# Patient Record
Sex: MALE | Race: WHITE | Employment: PART TIME | URBAN - METROPOLITAN AREA
[De-identification: names, ages, dates, MRNs, and addresses within clinical notes are randomized per-mention and may not be internally consistent; named-entity substitution may affect disease eponyms.]

---

## 2018-10-24 ENCOUNTER — APPOINTMENT (OUTPATIENT)
Dept: PHYSICAL THERAPY | Age: 23
End: 2018-10-24
Payer: COMMERCIAL

## 2018-10-25 ENCOUNTER — HOSPITAL ENCOUNTER (OUTPATIENT)
Dept: PHYSICAL THERAPY | Age: 23
Discharge: HOME | End: 2018-10-25
Payer: COMMERCIAL

## 2018-10-25 PROCEDURE — 97110 THERAPEUTIC EXERCISES: CPT

## 2018-10-25 PROCEDURE — 97161 PT EVAL LOW COMPLEX 20 MIN: CPT

## 2018-10-25 NOTE — PROGRESS NOTES
Josiah Marshall 33  [facility] AT [address] [place], [place] [address]carlito Marhsall \[facility] [place] [facility]\"" 47, [address] [place] [address], [address]  Phone: [phone]  Fax: [phone] PLAN OF CARE / STATEMENT OF MEDICAL NECESSITY FOR PHYSICAL THERAPY SERVICES Patient Name: Bianka Lozano : 1995 Medical  
Diagnosis: Low back pain [M54.5] Treatment Diagnosis: LBP Onset Date: 2018 Referral Source: Antoni Castellanos MD Start of Care [facility]): 10/25/2018 Prior Hospitalization: See medical history Provider #: [id number] Prior Level of Function: Unlimited ADLs and lifting without pain. Comorbidities: ADHD/Austism, HTN, asthma, depression, previous h/o LBP Medications: Verified on Patient Summary List  
The Plan of Care and following information is based on the information from the initial evaluation.  
================================================================================== Assessment / key information:  Pt is a 26 y.o. male who presents to [facility] with Dx of LBP. Patient reports initial onset of sx on 2018, KENDALL: lifting heavy weights at the gym while performing back squats and had immediate lower back. Patient reports sx are intermittent in nature. Lifting weight, lifting heavy objects increase sx, swimming, ice, icy hot, use of hot tub, occasion use of ibuprofen decrease symptoms. Average reported pain level at 3/10, 8/10 at worst & 0/10 at best.  Radicular Symptoms: none reported. Objective evaluation findings are as follows: 1) poor ability to maintain upright in unsupported sitting with increased thoracic kyphosis. 2) dec core strength noted in hooklying with difficulty activating TA, BLE strength is WNL with dec MMT into hip abduction and ER rated as 4-/5.  3) negative dural tension tests, PA mobility of l/s is WNL.  4) L/S AROM is full and pain free 5) poor squat form noted with increased lumbar flexion into extension throughout movement, poor dead lift form noted with increased lumbar flexion throughout movement. Pt signs and sx are consistent with lumbar strain/sprain with recurrent aggravation. Patient can benefit from PT interventions to improve strength, range of motion, balance, proprioception, coordination, lifting mechanics, and core stability, decrease pain, to facilitate ADLs & overall functional status.  
================================================================================== Eval Complexity: History MEDIUM  Complexity : 1-2 comorbidities / personal factors will impact the outcome/ POC ;  Examination  MEDIUM Complexity : 3 Standardized tests and measures addressing body structure, function, activity limitation and / or participation in recreation ; Presentation LOW Complexity : Stable, uncomplicated ;  Decision Making MEDIUM Complexity : FOTO score of 26-74; Overall Complexity LOW Problem List: pain affecting function, decrease ROM, decrease strength, edema affecting function, impaired gait/ balance, decrease ADL/ functional abilitiies, decrease activity tolerance, decrease flexibility/ joint mobility and decrease transfer abilities Treatment Plan may include any combination of the following: Therapeutic exercise, Therapeutic activities, Neuromuscular re-education, Physical agent/modality, Gait/balance training, Manual therapy, Aquatic therapy, Patient education, Self Care training, Functional mobility training, Home safety training and Stair training Patient / Family readiness to learn indicated by: asking questions, trying to perform skills and interest 
Persons(s) to be included in education: patient (P) Barriers to Learning/Limitations: None Measures taken:   
Patient Goal (s): \"pain relief\" Patient self reported health status: excellent Rehabilitation Potential: good ? Short Term Goals: To be accomplished in  2  weeks: 
1) Establish HEP to prevent further disability. 2) Patient will report decreased c/o pain to < or = 2/10 to facilitate ADLs with manageable sx in l/s. 3) Patient will demonstrate proper lifting mechanics with light objects to allow for return to safe weight lifting for gym exercise. ? Long Term Goals: To be accomplished in  4-6  weeks: 
1) Patient will be I and compliant with a progress, high level HEP in order to maintain gains made in physical therapy. 2) Patient will be able to maintain modified or regular plank for 30s with good form to allow for good core strength for high level therex. 3) Pt to return to unlimited recreational weight lifting to allow return to PLOF. 4) Pt will increase FOTO score to >/= 63 in order to maintain gains made in physical therapy. Frequency / Duration:   Patient to be seen  2  times per week for 4-6  weeks: 
Patient / Caregiver education and instruction: self care, activity modification, brace/ splint application and exercises G-Codes (GP): CECY Therapist Signature: Mireya Lopez PT, DPT Date: 10/25/2018 Certification Period: NA Time: 4:29 PM  
=========================================================================================== I certify that the above Physical Therapy Services are being furnished while the patient is under my care. I agree with the treatment plan and certify that this therapy is necessary. Physician Signature:       Date:      Time:  Please sign and return to [facility] [place] or you may fax the signed copy to [phone]. Thank you.

## 2018-10-25 NOTE — PROGRESS NOTES
PHYSICAL THERAPY - DAILY TREATMENT NOTE Patient Name: Kourtney Huggins        Date: 10/25/2018 : 1995   yes Patient  Verified Visit #:     Insurance: Payor: MENDY / Plan: Georgiana Clarke PPO / Product Type: PPO / In time: 4:20P Out time: 5:00P Total Treatment Time: 40 Medicare/ [facility] Time Tracking (below) Total Timed Codes (min):  NA 1:1 Treatment Time:  NA  
TREATMENT AREA =  Low back pain [M54.5] SUBJECTIVE Pain Level (on 0 to 10 scale):  3  / 10 Medication Changes/New allergies or changes in medical history, any new surgeries or procedures?    no  If yes, update Summary List  
Subjective Functional Status/Changes:  []  No changes reported See POC OBJECTIVE Modalities Rationale:     decrease pain to improve patient's ability to perform unlimited work duties. min [] Estim, type/location:   
                                 []  att     []  unatt     []  w/US     []  w/ice    []  w/heat 
 min []  Mechanical Traction: type/lbs   
               []  pro   []  sup   []  int   []  cont    []  before manual    []  after manual  
 min []  Ultrasound, settings/location:    
 min []  Iontophoresis w/ dexamethasone, location:   
                                           []  take home patch       []  in clinic  
10 min []  Ice     [x]  Heat    location/position: Lumbar spine in supine   
 min []  Vasopneumatic Device, press/temp:   
 min []  Other:   
[] Skin assessment post-treatment (if applicable):   
[]  intact    []  redness- no adverse reaction    
[]redness  adverse reaction:     
10 min Therapeutic Exercise:  [x]  See flow sheet Rationale:      increase ROM, increase strength, improve coordination, improve balance and increase proprioception to improve the patients ability to perform unlimited ADLs  
 min Patient Education:  yes  Reviewed HEP []  Progressed/Changed HEP based on:     
Other Objective/Functional Measures: 
 
See POC  
  
 Post Treatment Pain Level (on 0 to 10) scale:   0  / 10 ASSESSMENT Assessment/Changes in Function:  
 
See POC []  See Progress Note/Recertification Patient will continue to benefit from skilled PT services to modify and progress therapeutic interventions, address functional mobility deficits, address ROM deficits, address strength deficits, analyze and address soft tissue restrictions, analyze and cue movement patterns, analyze and modify body mechanics/ergonomics, assess and modify postural abnormalities, address imbalance/dizziness and instruct in home and community integration to attain remaining goals. Progress toward goals / Updated goals: 
See POC PLAN [x]  Upgrade activities as tolerated yes Continue plan of care  
[]  Discharge due to :   
[]  Other:   
 
Therapist: Jae Brown PT, DPT Date: 10/25/2018 Time: 5:21 PM  
 
Future Appointments Date Time Provider Ana Laura Johnson 10/30/2018  6:00 PM Cheri Arriola, [place] [facility]  
10/31/2018  6:00 PM Jennifer Barrientos PT [facility]  
11/6/2018  4:30 PM Ross Jaquez [facility]  
11/7/2018  5:30 PM Jennifer Barrientos PT [facility]  
11/13/2018  5:00 PM Cheri Arriola PT [facility]  
11/14/2018  6:00 PM Jennifer Barrientos, PT [facility]

## 2018-10-30 ENCOUNTER — HOSPITAL ENCOUNTER (OUTPATIENT)
Dept: PHYSICAL THERAPY | Age: 23
Discharge: HOME | End: 2018-10-30
Payer: COMMERCIAL

## 2018-10-30 PROCEDURE — 97530 THERAPEUTIC ACTIVITIES: CPT

## 2018-10-30 PROCEDURE — 97110 THERAPEUTIC EXERCISES: CPT

## 2018-10-30 NOTE — PROGRESS NOTES
PHYSICAL THERAPY - DAILY TREATMENT NOTE Patient Name: Paz Blas        Date: 10/30/2018 : 1995   YES Patient  Verified Visit #:   2   of   12  Insurance: Payor: MENDY / Plan: Kristopher Shafer PPO / Product Type: PPO / In time: 6:00 P Out time: 7:00 P Total Treatment Time: 54 Medicare Time Tracking (below) Total Timed Codes (min):  NA 1:1 Treatment Time:  NA  
TREATMENT AREA =  Low back pain [M54.5] SUBJECTIVE Pain Level (on 0 to 10 scale):  -3  / 10 Medication Changes/New allergies or changes in medical history, any new surgeries or procedures? NO    If yes, update Summary List  
Subjective Functional Status/Changes:  []  No changes reported Benja Real reports that he is holding off on weights at the gym until his back feels better. OBJECTIVE Modalities Rationale:     decrease pain to improve patient's ability to return to pain-free lifting 
 min [] Estim, type/location:   
                                 []  att     []  unatt     []  w/US     []  w/ice    []  w/heat 
 min []  Mechanical Traction: type/lbs   
               []  pro   []  sup   []  int   []  cont    []  before manual    []  after manual  
 min []  Ultrasound, settings/location:    
 min []  Iontophoresis w/ dexamethasone, location:   
                                           []  take home patch       []  in clinic  
10 min []  Ice     [x]  Heat    location/position: Supine to l/s   
 min []  Vasopneumatic Device, press/temp:   
 min []  Other:   
[] Skin assessment post-treatment (if applicable):   
[]  intact    []  redness- no adverse reaction    
[]redness  adverse reaction:     
35/ 
30 min Therapeutic Exercise:  [x]  See flow sheet Rationale:      increase ROM and increase strength to improve the patients ability to return to pain-free lifting  
 min Manual Therapy:   
Rationale:    
10 min Therapeutic Activity: Use of dowel & visual cues for proper hip hinge in standing as well as fxl squat & sit to stand transfers, max v/c for neutral spine Rationale:    improve coordination and increase proprioception to improve the patients ability to return to pain-free sport 
 
 min Neuromuscular Re-ed:   
Rationale:    
 
 min Gait Training:   
Rationale:    / 
25 
 min Patient Education:  Abimbola Burkett []  Progressed/Changed HEP based on: Other Objective/Functional Measures: Added SB stab in H/L, PPT Post Treatment Pain Level (on 0 to 10) scale:   1-2  / 10 ASSESSMENT Assessment/Changes in Function:  
 
Good tolerance to initial program, no increase in pain today 
  
[]  See Progress Note/Recertification Patient will continue to benefit from skilled PT services to modify and progress therapeutic interventions, address functional mobility deficits, address ROM deficits, address strength deficits, assess and modify postural abnormalities and instruct in home and community integration to attain remaining goals. Progress toward goals / Updated goals: 
Progressing towards goals established at [facility] PLAN [x]  Upgrade activities as tolerated YES Continue plan of care  
[]  Discharge due to :   
[]  Other:   
 
Therapist: Marianna Leon PT Date: 10/30/2018 Time: 7:08 PM  
 
Future Appointments Date Time Provider Ana Laura Johnson 10/31/2018  6:00 PM Antonino Becerra [facility]  
11/6/2018  4:30 PM Ilia Neal [facility]  
11/7/2018  5:30 PM Zuleyma Farris [facility]  
11/13/2018  5:00 PM Oracio Pena PT [facility]  
11/14/2018  6:00 PM Antonino Becerra PT [facility] [place] [facility]

## 2018-10-31 ENCOUNTER — HOSPITAL ENCOUNTER (OUTPATIENT)
Dept: PHYSICAL THERAPY | Age: 23
Discharge: HOME | End: 2018-10-31
Payer: COMMERCIAL

## 2018-10-31 PROCEDURE — 97110 THERAPEUTIC EXERCISES: CPT

## 2018-10-31 NOTE — PROGRESS NOTES
PHYSICAL THERAPY - DAILY TREATMENT NOTE Patient Name: Joey Russ        Date: 10/31/2018 : 1995   YES Patient  Verified Visit #:   3   of   12  Insurance: Payor: MENDY / Plan: Miguel Root PPO / Product Type: PPO / In time: 555 Out time: 707 Total Treatment Time: 45 Medicare/[employer] Time Tracking (below) Total Timed Codes (min):  \ 1:1 Treatment Time:  \  
TREATMENT AREA =  Low back pain [M54.5] SUBJECTIVE Pain Level (on 0 to 10 scale):  -3  / 10 Medication Changes/New allergies or changes in medical history, any new surgeries or procedures? NO    If yes, update Summary List  
Subjective Functional Status/Changes:  []  No changes reported No change in pain after LV OBJECTIVE Modalities Rationale:     decrease inflammation, decrease pain and increase tissue extensibility to improve patient's ability to perform functional ADLs 
 min [] Estim, type/location:   
                                 []  att     []  unatt     []  w/US     []  w/ice    []  w/heat 
 min []  Mechanical Traction: type/lbs   
               []  pro   []  sup   []  int   []  cont    []  before manual    []  after manual  
 min []  Ultrasound, settings/location:    
 min []  Iontophoresis w/ dexamethasone, location:   
                                           []  take home patch       []  in clinic  
 min []  Ice     []  Heat    location/position:   
 min []  Vasopneumatic Device, press/temp:   
 min []  Other:   
[] Skin assessment post-treatment (if applicable):   
[]  intact    []  redness- no adverse reaction    
[]redness  adverse reaction:     
45 min Therapeutic Exercise:  [x]  See flow sheet Rationale:      increase ROM and increase strength to improve the patients ability to regain functional mobility of L/S for return to weight lifting 
 min Manual Therapy: Technique:     
[] S/DTM []IASTM []PROM [] Passive Stretching []manual TPR  [] TDN (see objective; actual needle insertion time not billed) []Jt manipulation:Gr I [] II []  III [] IV[] V[] Treatment/Area:    
Rationale:      decrease pain, increase ROM, increase tissue extensibility and decrease trigger points to improve patient's ability to  min Therapeutic Activity:   
Rationale:    increase strength, improve coordination and increase proprioception to improve the patients ability to  
 min Neuromuscular Re-ed:   
Rationale:    improve coordination, improve balance and increase proprioception to improve the patients ability to  
 min Gait Training:   
Rationale:     min Patient Education:  YES  Reviewed HEP []  Progressed/Changed HEP based on: Other Objective/Functional Measures: 
 
TE per FS Frequent VC for inc hip hinge during both dowel squat and deadlift. Improved form in Vergie Fee rack, able to maintain neutral L/S and adequate hip hinge for both Post Treatment Pain Level (on 0 to 10) scale:   2  / 10 ASSESSMENT Assessment/Changes in Function:  
 
Progressed treatment as appropriate with good patient tolerance. Dec postural awareness difficulty finding neutral L/S in unsupported sitting without frequent cueing 
  
[]  See Progress Note/Recertification Patient will continue to benefit from skilled PT services to modify and progress therapeutic interventions, address functional mobility deficits, address ROM deficits, address strength deficits, analyze and address soft tissue restrictions, analyze and cue movement patterns, analyze and modify body mechanics/ergonomics and assess and modify postural abnormalities to attain remaining goals. Progress toward goals / Updated goals: 
Progressing towards STG1 PLAN [x]  Upgrade activities as tolerated YES Continue plan of care  
[]  Discharge due to :   
[]  Other:   
 
Therapist: Gustavo Joyner, PT, DPT, MTC, CMTPT Date: 10/31/2018 Time: 6:10 PM  
 
Future Appointments Date Time Provider Ana Laura Elizabeth 11/6/2018  4:30 PM [place] Samnao [facility]  
11/7/2018  5:30 PM Yinka Blackburn [facility]  
11/13/2018  5:00 PM Susana Michelle, PT [facility]  
11/14/2018  6:00 PM Elian Wong, PT [facility]

## 2018-11-06 ENCOUNTER — HOSPITAL ENCOUNTER (OUTPATIENT)
Dept: PHYSICAL THERAPY | Age: 23
Discharge: HOME | End: 2018-11-06
Payer: COMMERCIAL

## 2018-11-06 PROCEDURE — 97110 THERAPEUTIC EXERCISES: CPT

## 2018-11-06 NOTE — PROGRESS NOTES
PHYSICAL THERAPY - DAILY TREATMENT NOTE Patient Name: Joey Russ        Date: 2018 : 1995   yes Patient  Verified Visit #:     Insurance: Payor: MENDY / Plan: Miguel Root PPO / Product Type: PPO / In time: 4:20P Out time: 5:30P Total Treatment Time: 70 Medicare/ [facility] Time Tracking (below) Total Timed Codes (min):  NA 1:1 Treatment Time:  NA  
TREATMENT AREA =  Low back pain [M54.5] SUBJECTIVE Pain Level (on 0 to 10 scale):  2  / 10 Medication Changes/New allergies or changes in medical history, any new surgeries or procedures?    no  If yes, update Summary List  
Subjective Functional Status/Changes:  []  No changes reported \"My back is feeling better, and I feel like I am more conscious about movements. \" OBJECTIVE Modalities Rationale:     decrease pain to improve patient's ability to perform unlimited ADLs 
 min [] Estim, type/location:   
                                 []  att     []  unatt     []  w/US     []  w/ice    []  w/heat 
 min []  Mechanical Traction: type/lbs   
               []  pro   []  sup   []  int   []  cont    []  before manual    []  after manual  
 min []  Ultrasound, settings/location:    
 min []  Iontophoresis w/ dexamethasone, location:   
                                           []  take home patch       []  in clinic  
10 min []  Ice     [x]  Heat    location/position: L/s in supine   
 min []  Vasopneumatic Device, press/temp:   
 min []  Other:   
[] Skin assessment post-treatment (if applicable):   
[]  intact    []  redness- no adverse reaction    
[]redness  adverse reaction:     
60 min Therapeutic Exercise:  [x]  See flow sheet Rationale:      increase ROM, increase strength, improve coordination, improve balance and increase proprioception to improve the patients ability to perform unlimited ADLs  
 min Patient Education:  yes  Reviewed HEP []  Progressed/Changed HEP based on: Other Objective/Functional Measures: 
 
Difficulty performing hip hinge with dowel today Poor ability to recruit rectus abdominus to perform PPT with knees bent and in supine. Quick fatigue with squatting and maintaining a neutral spine Post Treatment Pain Level (on 0 to 10) scale:   0  / 10 ASSESSMENT Assessment/Changes in Function:  
 
Patient continues to have poor core strength and poor ability to recruit core mm in hooklying with difficulty with motor control performing deadlift motion > squat. []  See Progress Note/Recertification Patient will continue to benefit from skilled PT services to modify and progress therapeutic interventions, address functional mobility deficits, address ROM deficits, address strength deficits, analyze and address soft tissue restrictions, analyze and cue movement patterns, analyze and modify body mechanics/ergonomics, assess and modify postural abnormalities, address imbalance/dizziness and instruct in home and community integration to attain remaining goals. Progress toward goals / Updated goals: 
Slow progress towards goals. PLAN [x]  Upgrade activities as tolerated yes Continue plan of care  
[]  Discharge due to :   
[]  Other:   
 
Therapist: Lanie Ruth PT, DPT Date: 11/6/2018 Time: 6:51 PM  
 
Future Appointments Date Time Provider Ana Laura Johnson 11/7/2018  5:30 PM Gurmeet Murillo [facility]  
11/13/2018  5:00 PM Siomara Wells, PT [facility]  
11/14/2018  6:00 PM Sesar Leal PT [facility]

## 2018-11-07 ENCOUNTER — APPOINTMENT (OUTPATIENT)
Dept: PHYSICAL THERAPY | Age: 23
End: 2018-11-07
Payer: COMMERCIAL

## 2018-11-13 ENCOUNTER — HOSPITAL ENCOUNTER (OUTPATIENT)
Dept: PHYSICAL THERAPY | Age: 23
Discharge: HOME | End: 2018-11-13
Payer: COMMERCIAL

## 2018-11-13 PROCEDURE — 97110 THERAPEUTIC EXERCISES: CPT

## 2018-11-14 ENCOUNTER — HOSPITAL ENCOUNTER (OUTPATIENT)
Dept: PHYSICAL THERAPY | Age: 23
Discharge: HOME | End: 2018-11-14
Payer: COMMERCIAL

## 2018-11-14 PROCEDURE — 97110 THERAPEUTIC EXERCISES: CPT

## 2018-11-14 NOTE — PROGRESS NOTES
PHYSICAL THERAPY - DAILY TREATMENT NOTE Patient Name: Martine Geronimo        Date: 2018 : 1995   YES Patient  Verified Visit #:     Insurance: Payor: MENDY / Plan: Vandana Rivera PPO / Product Type: PPO / In time: 4:55 P Out time: 5:55 P Total Treatment Time: 54 Medicare Time Tracking (below) Total Timed Codes (min):  NA 1:1 Treatment Time:  NA  
TREATMENT AREA =  Low back pain [M54.5] SUBJECTIVE Pain Level (on 0 to 10 scale):  2  / 10 Medication Changes/New allergies or changes in medical history, any new surgeries or procedures? NO    If yes, update Summary List  
Subjective Functional Status/Changes:  []  No changes reported Patient reports that he is feeling much better & working on his posture. OBJECTIVE Modalities Rationale:     decrease pain to improve patient's ability to return to pain-free lifting 
 min [] Estim, type/location:   
                                 []  att     []  unatt     []  w/US     []  w/ice    []  w/heat 
 min []  Mechanical Traction: type/lbs   
               []  pro   []  sup   []  int   []  cont    []  before manual    []  after manual  
 min []  Ultrasound, settings/location:    
 min []  Iontophoresis w/ dexamethasone, location:   
                                           []  take home patch       []  in clinic  
10 min []  Ice     [x]  Heat    location/position: Supine to l/s   
 min []  Vasopneumatic Device, press/temp:   
 min []  Other:   
[] Skin assessment post-treatment (if applicable):   
[]  intact    []  redness- no adverse reaction    
[]redness  adverse reaction:     
45 min Therapeutic Exercise:  [x]  See flow sheet Rationale:      increase ROM and increase strength to improve the patients ability to return to pain-free lifting program  
 min Manual Therapy:   
Rationale:      
 min Therapeutic Activity:   
Rationale:     
 
 min Neuromuscular Re-ed:   
Rationale:   
 
 min Gait Training: Rationale:     min Patient Education:  YES  Reviewed HEP []  Progressed/Changed HEP based on: Other Objective/Functional Measures: Modified DL at [facility] 207 to 15 & 25# DB Post Treatment Pain Level (on 0 to 10) scale:   0  / 10 ASSESSMENT Assessment/Changes in Function:  
  
Good BM with neutral posture with dowel with hip hinge, squats with dowel & walking lunge, v/c & use of mirror to avoid l/s collapse with flexion & to control depth of squat as  Well as improved form with DL with use of dumbells today  
  
[]  See Progress Note/Recertification Patient will continue to benefit from skilled PT services to modify and progress therapeutic interventions, address functional mobility deficits, address ROM deficits, address strength deficits, analyze and address soft tissue restrictions, analyze and modify body mechanics/ergonomics, assess and modify postural abnormalities and instruct in home and community integration to attain remaining goals. Progress toward goals / Updated goals: 
Progressing towards LTGs PLAN [x]  Upgrade activities as tolerated YES Continue plan of care  
[]  Discharge due to :   
[]  Other:   
 
Therapist: Alison Lopez PT Date: 11/13/2018 Time: 8:46 PM  
 
Future Appointments Date Time Provider Ana Laura Johnson 11/14/2018  6:00 PM Hanna Jimenez, [place] [facility]

## 2018-11-15 NOTE — PROGRESS NOTES
PHYSICAL THERAPY - DAILY TREATMENT NOTE Patient Name: Lina Reveles        Date: 2018 : 1995   YES Patient  Verified Visit #:     Insurance: Payor: Sandie Munoz / Plan: Kevin Quinones PPO / Product Type: PPO / In time: 600 Out time: 700 Total Treatment Time: 54 Medicare/[facility] Time Tracking (below) Total Timed Codes (min):   1:1 Treatment Time:    
TREATMENT AREA =  Low back pain [M54.5] SUBJECTIVE Pain Level (on 0 to 10 scale):  2  / 10 Medication Changes/New allergies or changes in medical history, any new surgeries or procedures? NO    If yes, update Summary List  
Subjective Functional Status/Changes:  []  No changes reported Back is a yakelin;e better but I havent been doing squats or DL at the gym OBJECTIVE Modalities Rationale:     decrease inflammation, decrease pain and increase tissue extensibility to improve patient's ability to perform functional ADLs 
 min [] Estim, type/location:   
                                 []  att     []  unatt     []  w/US     []  w/ice    []  w/heat 
 min []  Mechanical Traction: type/lbs   
               []  pro   []  sup   []  int   []  cont    []  before manual    []  after manual  
 min []  Ultrasound, settings/location:    
 min []  Iontophoresis w/ dexamethasone, location:   
                                           []  take home patch       []  in clinic  
10 min []  Ice     [x]  Heat    location/position: L/S in supine  
 min []  Vasopneumatic Device, press/temp:   
 min []  Other:   
[] Skin assessment post-treatment (if applicable):   
[]  intact    []  redness- no adverse reaction    
[]redness  adverse reaction:     
45 min Therapeutic Exercise:  [x]  See flow sheet Rationale:      increase ROM and increase strength to improve the patients ability to regain functional mobility of L/S for return to weight lifting 
 min Manual Therapy: Technique:     
[] S/DTM []IASTM []PROM [] Passive Stretching []manual TPR  [] TDN (see objective; actual needle insertion time not billed) []Jt manipulation:Gr I [] II []  III [] IV[] V[] Treatment/Area:    
Rationale:      decrease pain, increase ROM, increase tissue extensibility and decrease trigger points to improve patient's ability to  min Therapeutic Activity:   
Rationale:    increase strength, improve coordination and increase proprioception to improve the patients ability to  
 min Neuromuscular Re-ed:   
Rationale:    improve coordination, improve balance and increase proprioception to improve the patients ability to  
 min Gait Training:   
Rationale:     min Patient Education:  YES  Reviewed HEP []  Progressed/Changed HEP based on: Other Objective/Functional Measures: 
 
TE per FS Added TA core stability TE with stab cuff Post Treatment Pain Level (on 0 to 10) scale:   0  / 10 ASSESSMENT Assessment/Changes in Function:  
 
Difficulty isolating TA without moving into lumbar flexion, requiring constant cues for form during mat TE. Improved hip hinge with Liang squat/DL but poor postural awareness and collapse of l/s into flexion at end range 
  
[]  See Progress Note/Recertification Patient will continue to benefit from skilled PT services to modify and progress therapeutic interventions, address functional mobility deficits, address ROM deficits, address strength deficits, analyze and address soft tissue restrictions, analyze and cue movement patterns, analyze and modify body mechanics/ergonomics and assess and modify postural abnormalities to attain remaining goals. Progress toward goals / Updated goals: 
Progressing towards STG3 PLAN [x]  Upgrade activities as tolerated YES Continue plan of care  
[]  Discharge due to :   
[]  Other:   
 
Therapist: Alka Odell, PT, DPT, MTC, CMTPT Date: 11/14/2018 Time: 6:10 PM  
 
No future appointments.

## 2018-11-28 ENCOUNTER — HOSPITAL ENCOUNTER (OUTPATIENT)
Dept: PHYSICAL THERAPY | Age: 23
Discharge: HOME | End: 2018-11-28
Payer: COMMERCIAL

## 2018-11-28 PROCEDURE — 97110 THERAPEUTIC EXERCISES: CPT

## 2018-11-28 NOTE — PROGRESS NOTES
PHYSICAL THERAPY - DAILY TREATMENT NOTE Patient Name: Roque Kevin        Date: 2018 : 1995   YES Patient  Verified Visit #:     Insurance: Payor: MENDY / Plan: Cherise Jaimes PPO / Product Type: PPO / In time: 800 Out time: 900 Total Treatment Time: 54 Medicare/[facility] Time Tracking (below) Total Timed Codes (min):   1:1 Treatment Time:    
TREATMENT AREA =  Low back pain [M54.5] SUBJECTIVE Pain Level (on 0 to 10 scale):  1.5  / 10 Medication Changes/New allergies or changes in medical history, any new surgeries or procedures? NO    If yes, update Summary List  
Subjective Functional Status/Changes:  []  No changes reported Feel the pain when I lean back and to the right and when I first pick something up, even if its light weight OBJECTIVE Modalities Rationale:     decrease inflammation, decrease pain and increase tissue extensibility to improve patient's ability to perform functional ADLs 
 min [] Estim, type/location:   
                                 []  att     []  unatt     []  w/US     []  w/ice    []  w/heat 
 min []  Mechanical Traction: type/lbs   
               []  pro   []  sup   []  int   []  cont    []  before manual    []  after manual  
 min []  Ultrasound, settings/location:    
 min []  Iontophoresis w/ dexamethasone, location:   
                                           []  take home patch       []  in clinic  
TC min []  Ice     []  Heat    location/position:   
 min []  Vasopneumatic Device, press/temp:   
 min []  Other:   
[] Skin assessment post-treatment (if applicable):   
[]  intact    []  redness- no adverse reaction    
[]redness  adverse reaction:     
55 min Therapeutic Exercise:  [x]  See flow sheet Rationale:      increase ROM and increase strength to improve the patients ability to regain functional mobility of L/S for return to weight lifting 
 min Manual Therapy: Technique: [] S/DTM []IASTM []PROM [] Passive Stretching  
[]manual TPR  [] TDN (see objective; actual needle insertion time not billed) []Jt manipulation:Gr I [] II []  III [] IV[] V[] Treatment/Area:    
Rationale:      decrease pain, increase ROM, increase tissue extensibility and decrease trigger points to improve patient's ability to  min Therapeutic Activity:   
Rationale:    increase strength, improve coordination and increase proprioception to improve the patients ability to  
 min Neuromuscular Re-ed:   
Rationale:    improve coordination, improve balance and increase proprioception to improve the patients ability to  
 min Gait Training:   
Rationale:     min Patient Education:  YES  Reviewed HEP []  Progressed/Changed HEP based on: Other Objective/Functional Measures: 
 
TE per FS Utilized visual cue form mirror and video filming to improve squat/deadlift mechanics Post Treatment Pain Level (on 0 to 10) scale:   0  / 10 ASSESSMENT Assessment/Changes in Function:  
 
Cont to require cueing to prevent L/S collapse into flexion at bottom of squat/deadlift. Difficulty maintaining scapular retraction and neutral upper t/s during deadlifts. Used postural taping technique as cue 
  
[]  See Progress Note/Recertification Patient will continue to benefit from skilled PT services to modify and progress therapeutic interventions, address functional mobility deficits, address ROM deficits, address strength deficits, analyze and address soft tissue restrictions, analyze and cue movement patterns, analyze and modify body mechanics/ergonomics and assess and modify postural abnormalities to attain remaining goals. Progress toward goals / Updated goals: 
Progressing towards STG3 PLAN [x]  Upgrade activities as tolerated YES Continue plan of care  
[]  Discharge due to :   
[]  Other:   
 
Therapist: Alona Dorman, PT, DPT, MTC, CMTPT Date: 11/28/2018 Time: 6:10 PM  
 
Future Appointments Date Time Provider Ana Laura Elizabeth 12/3/2018  5:00 PM [facility] [place]  
12/5/2018  9:30 AM Maggie Carter [facility]  
12/10/2018  5:00 PM [facility] [place]  
12/12/2018  8:00 AM [facility]

## 2018-12-03 ENCOUNTER — HOSPITAL ENCOUNTER (OUTPATIENT)
Dept: PHYSICAL THERAPY | Age: 23
Discharge: HOME | End: 2018-12-03
Payer: COMMERCIAL

## 2018-12-03 PROCEDURE — 97110 THERAPEUTIC EXERCISES: CPT

## 2018-12-03 NOTE — PROGRESS NOTES
PHYSICAL THERAPY - DAILY TREATMENT NOTE Patient Name: Paul Larkin        Date: 12/3/2018 : 1995   YES Patient  Verified Visit #:     Insurance: Payor: MENDY / Plan: Nicole Matt PPO / Product Type: PPO / In time: 4:50 PM Out time: 5:50 PM  
Total Treatment Time: 60 Medicare Time Tracking (below) Total Timed Codes (min):  NA 1:1 Treatment Time:  NA  
TREATMENT AREA =  Low back pain [M54.5] SUBJECTIVE Pain Level (on 0 to 10 scale):  2  / 10 Medication Changes/New allergies or changes in medical history, any new surgeries or procedures? NO    If yes, update Summary List  
Subjective Functional Status/Changes:  []  No changes reported Patient reports having to sit in class for a long time today so his back is a little annoyed. OBJECTIVE Modalities Rationale:     decrease pain to improve patient's ability to return to pain-free lifting 
 min [] Estim, type/location:   
                                 []  att     []  unatt     []  w/US     []  w/ice    []  w/heat 
 min []  Mechanical Traction: type/lbs   
               []  pro   []  sup   []  int   []  cont    []  before manual    []  after manual  
 min []  Ultrasound, settings/location:    
 min []  Iontophoresis w/ dexamethasone, location:   
                                           []  take home patch       []  in clinic  
10 min []  Ice     [x]  Heat    location/position: To L/S in supine with wedge post-session  
 min []  Vasopneumatic Device, press/temp:   
 min []  Other:   
[] Skin assessment post-treatment (if applicable):   
[]  intact    []  redness- no adverse reaction    
[]redness  adverse reaction:     
50 min Therapeutic Exercise:  [x]  See flow sheet Rationale:      increase ROM and increase strength to improve the patients ability to return to pain free lifting with good body mechanics 
 min Manual Therapy:   
Rationale:      
 min Therapeutic Activity:   
Rationale: min Neuromuscular Re-ed:   
Rationale:   
 
 min Gait Training:   
Rationale:     min Patient Education:  Juan Antonio Lee []  Progressed/Changed HEP based on: Other Objective/Functional Measures: Alternated between Smith squat and prone Ts on bench (superset) and Liang deadlift with seated TB bilateral ER (superset) to improve scapular activation Post Treatment Pain Level (on 0 to 10) scale:   0  / 10 ASSESSMENT Assessment/Changes in Function:  
 
Noted increase ms fatigue with 2nd set of squats and deadlifts; demonstrated increase L/S flexion and poor scap activation. Noted improved technique following rest and superset. []  See Progress Note/Recertification Patient will continue to benefit from skilled PT services to modify and progress therapeutic interventions, address functional mobility deficits, address ROM deficits, address strength deficits, analyze and address soft tissue restrictions, analyze and modify body mechanics/ergonomics, assess and modify postural abnormalities and instruct in home and community integration to attain remaining goals. Progress toward goals / Updated goals: 
Good progress towards lifting body mechanics goals PLAN [x]  Upgrade activities as tolerated YES Continue plan of care  
[]  Discharge due to :   
[]  Other:   
 
Therapist: ROMINA Swann Date: 12/3/2018 Time: 7:04 PM  
 
Future Appointments Date Time Provider Ana Laura Johnson 12/3/2018  5:00 PM [facility]  
[facility]  
12/10/2018  5:00 PM [facility]  
[facility]2018  [facility]

## 2018-12-05 ENCOUNTER — HOSPITAL ENCOUNTER (OUTPATIENT)
Dept: PHYSICAL THERAPY | Age: 23
Discharge: HOME | End: 2018-12-05
Payer: COMMERCIAL

## 2018-12-05 PROCEDURE — 97110 THERAPEUTIC EXERCISES: CPT

## 2018-12-05 NOTE — PROGRESS NOTES
PHYSICAL THERAPY - DAILY TREATMENT NOTE Patient Name: Ovidio Bidding        Date: 2018 : 1995   yes Patient  Verified Visit #:     Insurance: Payor: MENDY / Plan: Nohemy Castaneda PPO / Product Type: PPO / In time: 9:30A Out time: 10:40A Total Treatment Time: 70 Medicare/ [facility] Time Tracking (below) Total Timed Codes (min):  NA 1:1 Treatment Time:  NA  
TREATMENT AREA =  Low back pain [M54.5] SUBJECTIVE Pain Level (on 0 to 10 scale):  2  / 10 Medication Changes/New allergies or changes in medical history, any new surgeries or procedures?    no  If yes, update Summary List  
Subjective Functional Status/Changes:  []  No changes reported \"I am doing better, still not squatting or lifting in the gym. \": OBJECTIVE Modalities Rationale:     decrease pain to improve patient's ability to perform unlimited ADLs 
 min [] Estim, type/location:   
                                 []  att     []  unatt     []  w/US     []  w/ice    []  w/heat 
 min []  Mechanical Traction: type/lbs   
               []  pro   []  sup   []  int   []  cont    []  before manual    []  after manual  
 min []  Ultrasound, settings/location:    
 min []  Iontophoresis w/ dexamethasone, location:   
                                           []  take home patch       []  in clinic  
10 min []  Ice     [x]  Heat    location/position: L/s in supine  
 min []  Vasopneumatic Device, press/temp:   
 min []  Other:   
[] Skin assessment post-treatment (if applicable):   
[]  intact    []  redness- no adverse reaction    
[]redness  adverse reaction:     
60 min Therapeutic Exercise:  [x]  See flow sheet Rationale:      increase ROM, increase strength, improve coordination, improve balance and increase proprioception to improve the patients ability to perform unlimited ADLs   
 min Patient Education:  yes  Reviewed HEP []  Progressed/Changed HEP based on: Other Objective/Functional Measures: See PN Post Treatment Pain Level (on 0 to 10) scale:   0  / 10 ASSESSMENT Assessment/Changes in Function:  
 
See PN  
  
[]  See Progress Note/Recertification Patient will continue to benefit from skilled PT services to modify and progress therapeutic interventions, address functional mobility deficits, address ROM deficits, address strength deficits, analyze and address soft tissue restrictions, analyze and cue movement patterns, analyze and modify body mechanics/ergonomics, assess and modify postural abnormalities, address imbalance/dizziness and instruct in home and community integration to attain remaining goals. Progress toward goals / Updated goals: 
See PN   
 
PLAN [x]  Upgrade activities as tolerated yes Continue plan of care  
[]  Discharge due to :   
[]  Other:   
 
Therapist: Beverley Hameed PT, DPT Date: 12/5/2018 Time: 10:13 AM  
 
Future Appointments Date Time Provider Ana Laura Johnson 12/10/2018  5:00 PM [facility]  
12/12/2018  8:00 AM Nessa Brooks [facility]

## 2018-12-05 NOTE — PROGRESS NOTES
[address] [facility] PHYSICAL THERAPY  [facility], [address] ,[address], [address]  Phone: [phone]  Fax: [phone] PROGRESS NOTE Patient Name: Donny Hall : 1995 Treatment/Medical Diagnosis: Low back pain [M54.5] Referral Source: Austin Metcalf MD    
Date of Initial Visit: 10/25/18 Attended Visits: 9 Missed Visits: 0  
SUMMARY OF TREATMENT Therapeutic exercise to increase strength, range of motion, balance, proprioception, coordination, postural control, core stability, and lifting mechanics. Modalities as needed for pain control. CURRENT STATUS Mr. Xie Parent has made good progress with physical therapy. He reports feeling 90% improved since starting physical therapy, however he continues to refrain from lifting in the gym. He has pain when lifting objects from the ground and while jogging. Pt continues to have difficulty finding a neutral spine in quadruped and while performing high level activities. Core stability is improving from hooklying to sitting requiring mod cuing for core activation in sitting and unsupported sitting. Please see below for progress towards LTG:     
 
Goal/Measure of Progress Goal Met? 1.  Establish HEP to prevent further disability. Status at last Eval: - Current Status: established yes 2. Patient will report decreased c/o pain to < or = 2/10 to facilitate ADLs with manageable sx in l/s. Status at last Eval: 0-8 Current Status: 0-1.5 yes 3. Patient will demonstrate proper lifting mechanics with light objects to allow for return to safe weight lifting for gym exercise. Status at last Eval: Poor mechanics and postural awareness Current Status: Improved postural awareness, requires min VC and visual cuing. Progressing New Goals to be achieved in __4__  weeks: 1. Patient will be I and compliant with a progress, high level HEP in order to maintain gains made in physical therapy. 2.  Patient will be able to maintain modified or regular plank for 30s with good form to allow for good core strength for high level therex. 3.  Pt to return to unlimited recreational weight lifting to allow return to PLOF. G-Codes (GP): NA 
RECOMMENDATIONS Continue PT rx 2x per week for 4 weeks to work towards LTG. If you have any questions/comments please contact us directly at [phone]. Thank you for allowing us to assist in the care of your patient. Therapist Signature: Brad Vera PT, DPT Date: 12/5/2018 Time: 10:13 AM  
NOTE TO PHYSICIAN:  PLEASE COMPLETE THE ORDERS BELOW AND FAX TO [facility]: [phone]. If you are unable to process this request in 24 hours please contact our office: [phone]. 
 
___ I have read the above report and request that my patient continue as recommended.  
___ I have read the above report and request that my patient continue therapy with the following changes/special instructions:_________________________________________________________  
___ I have read the above report and request that my patient be discharged from therapy.   
 
Physician Signature:       Date:      Time:

## 2018-12-10 ENCOUNTER — HOSPITAL ENCOUNTER (OUTPATIENT)
Dept: PHYSICAL THERAPY | Age: 23
Discharge: HOME | End: 2018-12-10
Payer: COMMERCIAL

## 2018-12-10 PROCEDURE — 97110 THERAPEUTIC EXERCISES: CPT

## 2018-12-10 NOTE — PROGRESS NOTES
PHYSICAL THERAPY - DAILY TREATMENT NOTE Patient Name: Jackie Ryan        Date: 12/10/2018 : 1995   YES Patient  Verified Visit #:     Insurance: Payor: MENDY / Plan: Isaac Galdamez PPO / Product Type: PPO / In time: 5:00 PM Out time: 5:57 PM  
Total Treatment Time: 62 Medicare Time Tracking (below) Total Timed Codes (min):  NA 1:1 Treatment Time:  NA  
TREATMENT AREA =  Low back pain [M54.5] SUBJECTIVE Pain Level (on 0 to 10 scale):  3 / 10 Medication Changes/New allergies or changes in medical history, any new surgeries or procedures? NO    If yes, update Summary List  
Subjective Functional Status/Changes:  []  No changes reported \"I went to squat at my gym. I had 45# plates on each side and I don't think I was ready. \" OBJECTIVE Modalities Rationale:     decrease pain to improve patient's ability to return to pain-free lifting 
 min [] Estim, type/location:   
                                 []  att     []  unatt     []  w/US     []  w/ice    []  w/heat 
 min []  Mechanical Traction: type/lbs   
               []  pro   []  sup   []  int   []  cont    []  before manual    []  after manual  
 min []  Ultrasound, settings/location:    
 min []  Iontophoresis w/ dexamethasone, location:   
                                           []  take home patch       []  in clinic  
10 min []  Ice     [x]  Heat    location/position: To L/S in supine with wedge post-session  
 min []  Vasopneumatic Device, press/temp:   
 min []  Other:   
[] Skin assessment post-treatment (if applicable):   
[]  intact    []  redness- no adverse reaction    
[]redness  adverse reaction:     
47 min Therapeutic Exercise:  [x]  See flow sheet Rationale:      increase ROM and increase strength to improve the patients ability to return to pain free lifting with good body mechanics 
 min Manual Therapy:   
Rationale:      
 min Therapeutic Activity:   
Rationale: min Neuromuscular Re-ed:   
Rationale:   
 
 min Gait Training:   
Rationale:     min Patient Education:  Rakan Armstrong []  Progressed/Changed HEP based on: Other Objective/Functional Measures: 
 
Increase weight with mojica deadlift Added modified fwd plank Post Treatment Pain Level (on 0 to 10) scale:   2  / 10 ASSESSMENT Assessment/Changes in Function:  
 
Noted no increase pain and maintained good body mechanics with added weight to deadlifts. Req'd cues for neutral spine during modified plank; improved technique after performing bird dogs. Educated patient on performing low weight, high rep at personal gym and alternating between scap stabilizing therex and lifting to decrease c/o LBP; patient responded with good understanding. []  See Progress Note/Recertification Patient will continue to benefit from skilled PT services to modify and progress therapeutic interventions, address functional mobility deficits, address ROM deficits, address strength deficits, analyze and address soft tissue restrictions, analyze and modify body mechanics/ergonomics, assess and modify postural abnormalities and instruct in home and community integration to attain remaining goals. Progress toward goals / Updated goals: 
Minimal progress towards LTG #2 PLAN [x]  Upgrade activities as tolerated YES Continue plan of care  
[]  Discharge due to :   
[]  Other:   
 
Therapist: ROMINA Broussard Date: 12/10/2018 Time: 6:33 PM  
 
Future Appointments Date Time Provider Ana Laura Johnson 12/10/2018  5:00 PM [facility]  
12/12/2018  8:00 AM Radha KAPADIA [facility]

## 2018-12-12 ENCOUNTER — HOSPITAL ENCOUNTER (OUTPATIENT)
Dept: PHYSICAL THERAPY | Age: 23
Discharge: HOME | End: 2018-12-12
Payer: COMMERCIAL

## 2018-12-12 PROCEDURE — 97110 THERAPEUTIC EXERCISES: CPT

## 2018-12-12 NOTE — PROGRESS NOTES
PHYSICAL THERAPY - DAILY TREATMENT NOTE    Patient Name: Diane Barry        Date: 2018  : 1995   yes Patient  Verified  Visit #:     Insurance: Payor: Lee Haq / Plan: Bishnu Spicer PPO / Product Type: PPO /      In time: 8:00A Out time: 9:05A   Total Treatment Time: 65     Medicare/ [facility] Time Tracking (below)   Total Timed Codes (min):  NA 1:1 Treatment Time:  NA     TREATMENT AREA =  Low back pain [M54.5]    SUBJECTIVE  Pain Level (on 0 to 10 scale):  2  / 10   Medication Changes/New allergies or changes in medical history, any new surgeries or procedures?    no  If yes, update Summary List   Subjective Functional Status/Changes:  []  No changes reported     \"I have been doing pretty good, not too bad since last time. \"           OBJECTIVE  Modalities Rationale:     decrease pain to improve patient's ability to perform unlimited ADLs    min [] Estim, type/location:                                      []  att     []  unatt     []  w/US     []  w/ice    []  w/heat    min []  Mechanical Traction: type/lbs                   []  pro   []  sup   []  int   []  cont    []  before manual    []  after manual    min []  Ultrasound, settings/location:      min []  Iontophoresis w/ dexamethasone, location:                                               []  take home patch       []  in clinic   10 min []  Ice     [x]  Heat    location/position: L/s in supine    min []  Vasopneumatic Device, press/temp:     min []  Other:    [] Skin assessment post-treatment (if applicable):    []  intact    []  redness- no adverse reaction     []redness  adverse reaction:        55 min Therapeutic Exercise:  [x]  See flow sheet   Rationale:      increase ROM, increase strength, improve coordination, improve balance and increase proprioception to improve the patients ability to perform unlimited ADLs         min Patient Education:  yes  Reviewed HEP   []  Progressed/Changed HEP based on:        Other Objective/Functional Measures:    Min cuing to maintain neutral spine during DL and squat. Post Treatment Pain Level (on 0 to 10) scale:   0  / 10     ASSESSMENT  Assessment/Changes in Function:     Patient is making good gains with PT rx with improvements in ability to perform various lifting movements. []  See Progress Note/Recertification   Patient will continue to benefit from skilled PT services to modify and progress therapeutic interventions, address functional mobility deficits, address ROM deficits, address strength deficits, analyze and address soft tissue restrictions, analyze and cue movement patterns, analyze and modify body mechanics/ergonomics, assess and modify postural abnormalities, address imbalance/dizziness and instruct in home and community integration to attain remaining goals. Progress toward goals / Updated goals:    Progressing towards LTG 2. PLAN  [x]  Upgrade activities as tolerated yes Continue plan of care   []  Discharge due to :    []  Other:      Therapist: Pete Du PT, DPT    Date: 12/12/2018 Time: 10:42 AM     No future appointments.

## 2018-12-21 ENCOUNTER — HOSPITAL ENCOUNTER (OUTPATIENT)
Dept: PHYSICAL THERAPY | Age: 23
Discharge: HOME | End: 2018-12-21
Payer: COMMERCIAL

## 2018-12-21 PROCEDURE — 97110 THERAPEUTIC EXERCISES: CPT

## 2018-12-21 NOTE — PROGRESS NOTES
PHYSICAL THERAPY - DAILY TREATMENT NOTE    Patient Name: Roque Kevin        Date: 2018  : 1995   yes Patient  Verified  Visit #:     Insurance: Payor: Aida Ga / Plan: Cherise Service PPO / Product Type: PPO /      In time: 10:00A Out time: 11:00A   Total Treatment Time: 61     Medicare/ [facility] Time Tracking (below)   Total Timed Codes (min):  NA 1:1 Treatment Time:  NA     TREATMENT AREA =  Low back pain [M54.5]    SUBJECTIVE  Pain Level (on 0 to 10 scale):  2  / 10   Medication Changes/New allergies or changes in medical history, any new surgeries or procedures?    no  If yes, update Summary List   Subjective Functional Status/Changes:  []  No changes reported     \"I am doing good, I did leg day at the gym, however I didn't do squats or deadlifts. \"          OBJECTIVE  Modalities Rationale:     decrease pain to improve patient's ability to perform unlimited ADLs    min [] Estim, type/location:                                      []  att     []  unatt     []  w/US     []  w/ice    []  w/heat    min []  Mechanical Traction: type/lbs                   []  pro   []  sup   []  int   []  cont    []  before manual    []  after manual    min []  Ultrasound, settings/location:      min []  Iontophoresis w/ dexamethasone, location:                                               []  take home patch       []  in clinic   10 min []  Ice     [x]  Heat    location/position: L/s in supine     min []  Vasopneumatic Device, press/temp:     min []  Other:    [] Skin assessment post-treatment (if applicable):    []  intact    []  redness- no adverse reaction     []redness  adverse reaction:        50 min Therapeutic Exercise:  [x]  See flow sheet   Rationale:      increase ROM, increase strength, improve coordination, improve balance and increase proprioception to improve the patients ability to perform unlimited ADLs            min Patient Education:  yes  Reviewed HEP   []  Progressed/Changed HEP based on: Other Objective/Functional Measures:    Preformed reg plank for 10s with notable core shaking    Quick fatigue with mojica squat and deadlift today. Post Treatment Pain Level (on 0 to 10) scale:   2  / 10     ASSESSMENT  Assessment/Changes in Function:     Notable improvements in core stability today. []  See Progress Note/Recertification   Patient will continue to benefit from skilled PT services to modify and progress therapeutic interventions, address functional mobility deficits, address ROM deficits, address strength deficits, analyze and address soft tissue restrictions, analyze and cue movement patterns, analyze and modify body mechanics/ergonomics, assess and modify postural abnormalities, address imbalance/dizziness and instruct in home and community integration to attain remaining goals. Progress toward goals / Updated goals:    Progressing towards LTG 2.       PLAN  [x]  Upgrade activities as tolerated yes Continue plan of care   []  Discharge due to :    []  Other:      Therapist: Ar Qureshi PT, DPT    Date: 12/21/2018 Time: 12:39 PM     Future Appointments   Date Time Provider Ana Laura Johnson   12/26/2018  3:30 PM Ruben Connolly PT [facility] [place]   12/31/2018  3:00 PM Becki Keller [facility] [place]

## 2018-12-31 ENCOUNTER — HOSPITAL ENCOUNTER (OUTPATIENT)
Dept: PHYSICAL THERAPY | Age: 23
Discharge: HOME | End: 2018-12-31
Payer: COMMERCIAL

## 2018-12-31 PROCEDURE — 97110 THERAPEUTIC EXERCISES: CPT

## 2018-12-31 NOTE — PROGRESS NOTES
PHYSICAL THERAPY - DAILY TREATMENT NOTE Patient Name: Pat Becerra        Date: 2018 : 1995   YES Patient  Verified Visit #:   15   of   20  Insurance: Payor: MENDY / Plan: Ariella Dowd PPO / Product Type: PPO / In time: 3:19 PM Out time: 4:22 PM  
Total Treatment Time: 61 Medicare Time Tracking (below) Total Timed Codes (min):  NA 1:1 Treatment Time:  NA  
TREATMENT AREA =  Low back pain [M54.5] SUBJECTIVE Pain Level (on 0 to 10 scale):  2 / 10 Medication Changes/New allergies or changes in medical history, any new surgeries or procedures? NO    If yes, update Summary List  
Subjective Functional Status/Changes:  []  No changes reported Pt reports feeling better overall despite not being seen in 1 week due to the holidays. States that he flew to [place] over the holidays and noted no low back pain. Allan Akins Also reports returning to lifting: squatting 100 lbs and benching with 50 to 90 lbs with no c/o LBP OBJECTIVE Modalities Rationale:     decrease pain to improve patient's ability to return to pain-free lifting 
 min [] Estim, type/location:   
                                 []  att     []  unatt     []  w/US     []  w/ice    []  w/heat 
 min []  Mechanical Traction: type/lbs   
               []  pro   []  sup   []  int   []  cont    []  before manual    []  after manual  
 min []  Ultrasound, settings/location:    
 min []  Iontophoresis w/ dexamethasone, location:   
                                           []  take home patch       []  in clinic  
10 min []  Ice     [x]  Heat    location/position: To L/S in supine with wedge post-session  
 min []  Vasopneumatic Device, press/temp:   
 min []  Other:   
[] Skin assessment post-treatment (if applicable):   
[]  intact    []  redness- no adverse reaction    
[]redness  adverse reaction:     
53 min Therapeutic Exercise:  [x]  See flow sheet Rationale:      increase ROM and increase strength to improve the patients ability to return to pain free lifting with good body mechanics 
 min Manual Therapy:   
Rationale:      
 min Therapeutic Activity:   
Rationale:     
 
 min Neuromuscular Re-ed:   
Rationale:   
 
 min Gait Training:   
Rationale:     min Patient Education:  YES  Reviewed HEP []  Progressed/Changed HEP based on: Other Objective/Functional Measures: 
 
Increase weight for Smith squats and deadlifts Post Treatment Pain Level (on 0 to 10) scale:  0  / 10 ASSESSMENT Assessment/Changes in Function:  
 
Req'd min vc's for proper technique and maintaining neutral spine with Liang deadlifts. Attempted to increase hold time to 30 seconds for full fwd planks, however continues to be challenged maintaining neutral spine during full fwd planks, requiring min cues for neutral spine and TA activation during therex. []  See Progress Note/Recertification Patient will continue to benefit from skilled PT services to modify and progress therapeutic interventions, address functional mobility deficits, address ROM deficits, address strength deficits, analyze and address soft tissue restrictions, analyze and modify body mechanics/ergonomics, assess and modify postural abnormalities and instruct in home and community integration to attain remaining goals. Progress toward goals / Updated goals: 
Continue for 1 more PT session then reassess LTGs Progressing towards LTGs #2 and #3 PLAN [x]  Upgrade activities as tolerated YES Continue plan of care  
[]  Discharge due to :   
[]  Other:   
 
Therapist: ROMINA Baca Date: 12/31/2018 Time: 4:54 PM  
 
Future Appointments Date Time Provider Ana Laura Johnson 12/31/2018  3:00 PM [facility] [place]

## 2019-02-27 NOTE — PROGRESS NOTES
[address]  [facility] [address] [place] [address] [facility], [address] [address] [place] [phone] [facility]  Phone: [phone]  Fax: [phone] DISCHARGE SUMMARY Patient Name: Rodríguez Marcos : 1995 Treatment/Medical Diagnosis: Low back pain [M54.5] Referral Source: Anastasiia Lopez, * Date of Initial Visit: 10/25/18 Attended Visits: 13 Missed Visits: 1 SUMMARY OF TREATMENT 
PT included therapeutic exercise to increase strength, range of motion, balance, proprioception, coordination, postural control, core stability, and lifting mechanics. Modalities as needed for pain control. CURRENT STATUS Pt was last seen on 18 for his low back pain. Pt reported he returned to lifting weights at the gym and had no c/o LBP during and following lifting. Able to demonstrate 3x20 seconds of modified forward planks with good core stability. Pt did not return to physical therapy therefore a formal reassessment of goals was not performed. Goal/Measure of Progress Goal Met? 1. Patient will be I and compliant with a progress, high level HEP in order to maintain gains made in physical therapy. Status at last Eval: Independent and compliant with basic HEP Current Status: Unable to assess N/A  
2. Patient will be able to maintain modified or regular plank for 30s with good form to allow for good core strength for high level therex. Status at last Eval: N/A Current Status: Unable to assess N/A  
3. Pt to return to unlimited recreational weight lifting to allow return to PLOF Status at last Eval: Refrained lifting weights at the gym Current Status: Unable to assess N.A  
 
RECOMMENDATIONS Other: Pt is discharged due to not returning. If you have any questions/comments please contact us directly at [phone]. Thank you for allowing us to assist in the care of your patient. Therapist Signature: ROMINA Solorzano Date: 19   Time: 3:56 PM